# Patient Record
Sex: FEMALE | Race: WHITE | URBAN - METROPOLITAN AREA
[De-identification: names, ages, dates, MRNs, and addresses within clinical notes are randomized per-mention and may not be internally consistent; named-entity substitution may affect disease eponyms.]

---

## 2019-12-05 ENCOUNTER — OFFICE VISIT (OUTPATIENT)
Dept: FAMILY MEDICINE | Facility: CLINIC | Age: 24
End: 2019-12-05

## 2019-12-05 VITALS
BODY MASS INDEX: 22.66 KG/M2 | DIASTOLIC BLOOD PRESSURE: 79 MMHG | OXYGEN SATURATION: 90 % | WEIGHT: 153 LBS | HEIGHT: 69 IN | SYSTOLIC BLOOD PRESSURE: 102 MMHG | TEMPERATURE: 98 F

## 2019-12-05 DIAGNOSIS — J20.8 VIRAL BRONCHITIS: Primary | ICD-10-CM

## 2019-12-05 RX ORDER — GUAIFENESIN 200 MG/10ML
200 LIQUID ORAL EVERY 4 HOURS PRN
Qty: 256 ML | Refills: 0 | Status: SHIPPED | OUTPATIENT
Start: 2019-12-05

## 2019-12-05 ASSESSMENT — MIFFLIN-ST. JEOR: SCORE: 1674.38

## 2019-12-06 NOTE — PROGRESS NOTES
Social Work Progress Note    Data: Bill is a 24-year-old who presented to the clinic with chest pain and a cough. They also stated they have fatigue and low energy due to the cough. They scored a 0 on the PHQ-2. They stated they have not had any major life stressors recently and have a robust support system.    Assessment: Bill does not appear to currently need mental health resources.    Plan: Bill is aware they can return to the clinic if they need mental health resources in the future.    KATARINA Coello Student

## 2019-12-06 NOTE — PATIENT INSTRUCTIONS
Patient Visit Summary    Patient Name: Bill Coello  MRN: [id number]    Date of Visit: 12/5/2019    Principle Diagnosis: Viral Bronchitis    Physician's Recommendations/Instructions:   1. We are providing you with Robitussin for your cough to help decongest rather than an antibiotic, since we do not think you have a bacterial pneumonia. Take 10 mL every 4 hours as needed.  2. Drink hot tea with local honey + lemon as needed for cough/congestion.      Physician: Tessa Cuevas MD

## 2019-12-06 NOTE — PROGRESS NOTES
"MEDICINE NOTE    SUBJECTIVE:  Bill is a 24-year-old female who presents to clinic with a 2-week history of productive cough related to a dull chest pain. The cough produced yellow mucus for the first 1.5 weeks, but turned into a dry cough 0.5 weeks ago. She describes the pain as an \"ache\" in the sternal area and rates it a 2/10 at rest and while coughing. The cough is slightly worse in the morning and night, and nothing seems to make it better or worse. She endorses mild nausea, general fatigue, and some mild stomach upset but says that this is not abnormal for her.    Social history is relevant for her partner's recent case of pneumonia, which was diagnosed in late October, treated with antibiotics and an inhaler, and resolved 3 weeks ago. His symptoms are similar to her current ones. She also returned 2 days ago from a 10-day holiday trip via plane to visit family in [facility], which included lots of hiking and time outdoors. She currently works part-time at the climbing [employer].     REVIEW OF SYSTEMS:  Gen: No fevers, no night sweats  Eyes: no vision changes  Ears, Noses, Mouth, Throat: No runny nose or change in hearing  Cardiac: \"Dull\" chest pain as described.   Lungs: Cough as described. No dyspnea, and wheezing only upon full expiration.  GI: no nausea, No vomiting, diarrhea or constipation, no abdominal pain  : N/A  Musculoskeletal: no joint or muscle pain or swelling  Skin: no concerning lesions or moles  Neuro: no loss of strength or sensation, no numbness or tingling, no tremor  Endo:  no temperature intolerance  Heme/Lymph: N/A  Allergy: no environmental allergies. Drug allergies include amoxicillin.  Psych: N/A    No past medical history on file.    No past surgical history on file.    No family history on file.    Social History     Socioeconomic History     Marital status: Not on file     Spouse name: Not on file     Number of children: Not on file     Years of education: Not " "on file     Highest education level: Not on file   Occupational History     Not on file   Social Needs     Financial resource strain: Not on file     Food insecurity:     Worry: Not on file     Inability: Not on file     Transportation needs:     Medical: Not on file     Non-medical: Not on file   Tobacco Use     Smoking status: Never Smoker     Smokeless tobacco: Never Used   Substance and Sexual Activity     Alcohol use: Not Currently     Drug use: Yes     Types: Marijuana     Sexual activity: Not on file   Lifestyle     Physical activity:     Days per week: Not on file     Minutes per session: Not on file     Stress: Not on file   Relationships     Social connections:     Talks on phone: Not on file     Gets together: Not on file     Attends Mu-ism service: Not on file     Active member of club or organization: Not on file     Attends meetings of clubs or organizations: Not on file     Relationship status: Not on file     Intimate partner violence:     Fear of current or ex partner: Not on file     Emotionally abused: Not on file     Physically abused: Not on file     Forced sexual activity: Not on file   Other Topics Concern     Not on file   Social History Narrative    CHW Summary 12/5/19:         Full med. Patient requested information related to low cost medications. CHW referred patient to [employer], as well as other low-cost options through the [place] [facility]        -JR, KB, AM       OBJECTIVE:  Physical Exam:  /79 (BP Location: Left arm, Patient Position: Sitting, Cuff Size: Adult Regular)   Temp 98  F (36.7  C) (Oral)   Ht 1.753 m (5' 9\")   Wt 69.4 kg (153 lb)   SpO2 90%   BMI 22.59 kg/m    Constitutional: Patient was alert, oriented, cheerful.  Eyes: N/A  Ears, Nose and Throat: Slight swelling in turbinates and nose.   Cardiovascular: regular rate and rhythm, normal S1 and S2, no murmurs, rubs or gallops, peripheral pulses full and symmetric   Respiratory: Normal breath " sounds with slight wheezes at end  Psychological: Alert and cheerful  Lymphatic: no lymphadenopathy noted    ASSESSMENT/PLAN:  Bill was seen today for cough.    Diagnoses and all orders for this visit:    Viral bronchitis  -     guaiFENesin (ROBITUSSIN) 100 MG/5ML liquid; Take 10 mLs (200 mg) by mouth every 4 hours as needed for cough      Assessment: Bill has a viral bronchitis.    Plan: Bill was prescribed Robitussin (guaifenesin) as a cough syrup to use as needed. It is also recommended that she use conservative OTC methods such as tea with lemon and local honey.     Bill received her after visit summary with recommendations for care. However, she left without receiving her filled prescription. Attempts to contact her by phone were unsuccessful (phone number did not go through).     Med Clinician: Dia Marcelino, MS2  Preceptor: Dr. Kiana Cuevas MD (Kate)    In supervising the medical student, I repeated the exam documented above.  I have reviewed and verified the student s documentation.  Supervising Provider:  Kiana Cuevas MD     I agree with medical student note as above with the following additional notes:    Ms Coello has been feeling ok overall but has bothersome, lingering cough that seems to arise from chest congestion (rather than tickle in her throat).  No fevers or chills.  Some chest discomfort with coughing.  Her significant other recently had pneumonia and she wanted to be sure this was not the same thing.  She has been moving a little more slowly recently, with fatigue, than her normal but was able to participate in outdoors activities/hiking during recent trip to [place] and has been able to accomplish her usual activities.  No history of seasonal/environmental allergies.    On exam, heart regular without murmurs, lungs with mild rhonchi, occasional wheeze but breath sounds present in all fields without evidence of consolidation by auscultation.  Bilateral ear canal & TM normal  appearing, oropharynx moist without lesions, moderate swelling of nasal turbinates with some erythema, no cervical lymphadenopathy noted.     Findings most consistent with viral URI and bronchitis - encouraged conservative/symptom management with guaifenesin for cough as well as tea/honey/hydration.    Kiana Cuevas MD  12/10/2019 11:50 AM

## 2019-12-06 NOTE — NURSING NOTE
Pt presented to clinic with complaints of a persistent cough for the last two weeks. Pt stated that her partner had pneumonia recently and that she was concerned. Home treatments include vicks vapor rub.    Darinel Barnes    Student Nurse Triage

## 2019-12-06 NOTE — NURSING NOTE
"Pt presented to clinic with complaints of chest pain associated with cough beginning 2 weeks ago. Pt is concerned because her partner had bacterial pneumonia diagnosed late October that was treated with antibiotics and an inhaler. Cough initially was productive, but for the last week has been dry. Cough worsens in morning and night and when laughing. Chest pain is located across upper sternum and is \"achey.\" Pt is experiencing fatigue, low energy, slight headache, and a stomach ache. Denies body ache, stiff neck, fever, and chills.    -Diane Yaenz, Student Nurse Clinician  "

## 2019-12-06 NOTE — PROGRESS NOTES
"[facility] Progress Note    Chief complaint: Cough & Chest Pain    Last date of full med (Med Refill only):    Subjective:  Condition 1: Cough & Chest Pain   Bill has been experiencing frequent cough starting 2 weeks ago. She also exhibits bilateral chest pain at onset of these coughs that linger. She describes it as a dull pain and rates it about a 1-2 out of 10 both during her coughs and at rest. She states that in mornings and night, the cough is worst. Upon a full exhalation, Bill states that her cough gets more wheezy. At the initial stages of this cough, Bill claims to have a yellow sputum. This has cleared up about half a week ago and now she exhibits a dry cough. To combat the cough and pain, she tried Ty, but it was ineffective. She also drinks peppermint/green tea with honey. This relieves pain only for a few minutes after consumption. Her sleep is unaffected by this cough. Her physical activity has not been affected greatly, and can carry out normal physical tasks, but with her cough still present.       Current Outpatient Medications   Medication Sig Dispense Refill     guaiFENesin (ROBITUSSIN) 100 MG/5ML liquid Take 10 mLs (200 mg) by mouth every 4 hours as needed for cough 256 mL 0         Objective:   /79 (BP Location: Left arm, Patient Position: Sitting, Cuff Size: Adult Regular)   Temp 98  F (36.7  C) (Oral)   Ht 1.753 m (5' 9\")   Wt 69.4 kg (153 lb)   SpO2 90%   BMI 22.59 kg/m      Assessment:     Condition 1- Cough & Chest Pain  DTP: Indication: Additional drug therapy needed  Rationale: After a physical examination, Bill appears to not have signs and symptoms of pneumonia. The physical examination indicated that viral bronchitis is likely due to the change in weather and season. To combat this, guaifenesin is to be used as an expectorant to help reduce chest congestion.      Plan:  1. Initiate guaifenesin 100mg/5 mL. Take 10 mL by mouth every 4 hours as needed  2. Drink " honey with tea as needed to help soothe the cough and pain.  3. Follow up in 1 week's time to assess efficacy    Pharmacy Follow-Up Plan (Method, Date, Parameters):    A follow up via phone call should be conducted in one week's time (12/12/19) to assess therapy's efficacy. Drug efficacy can be assessed with a decrease in cough frequency and pain level.    PharmCare Clinician: Bertrand Montez, PD2  Pharmacy Preceptor: Johana Casey PharmD    _____________________________  Preceptor Use Only:  In supervising the student, I have reviewed and verified the student's documentation and found it to be correct and complete.   Preceptor Signature: Johana Casey PharmD 12/6/19